# Patient Record
Sex: FEMALE | Race: ASIAN | NOT HISPANIC OR LATINO | URBAN - METROPOLITAN AREA
[De-identification: names, ages, dates, MRNs, and addresses within clinical notes are randomized per-mention and may not be internally consistent; named-entity substitution may affect disease eponyms.]

---
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
## 2021-01-13 NOTE — ED PEDIATRIC NURSE NOTE - CINV DISCH EXIT CARE INSTR PROVIDE
NO VISIT    VIDEO VISIT - no answer   Invited by text and email to vida - no response  Called all numbers via [employer] and not able to do video visit.     Date of Visit: January 22, 2021  Name: Chani Robbins  Date of Birth 1982  [address]   [facility] [address]  [phone] (M) - no answer  [phone] (W) -  No answer/voice mail  [phone] (H) - no answer/voicemail  [email or web address]  mychart  No proxy  Sree Robbins  [phone] - voice mail      EJ - care coordinator for gender identity clinic     Kimberlyn Butler CNP psychiatric nurse practitioner.    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    Discussion about PKAN/NBIA - had prior brain MRI 2016 - discussed considering another brain mri scan or to undergo genetic testing to see if Rose is a carrier.  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     Gait/Balance/Falls     Exercise/Therapy   ?Occupational therapy    Cognitive/Driving     Mood   Ongoing psychiatric care  Buspirone buspar 10mg 2 tabs 3/day   Citalopram celexa 40mg daily    Hydroxyzine vistaril 50mg 3/day as needed    Hallucinations/delusions   Olanzapine zydis zyprexa 5mg ODT as needed    Sleep   Melatonin 5mg    Bladder     GI/Constipation/GERD   Has not done swallow study   Famotidine pepcide 40mg   Hyoscyamine levsin 0.125mg sublingual  Loperamide imodium  2mg   Magnesium oxide 400mg  Omeprazole prilosec 20mg  Odansetron zofran 4mg ODT  Probiotic     ENDO   Atorvastatin lipitor 20mg  Dulaglutide trulicity 0.75mg/0.5ml  weekly  Levonorgestrel kyleena 19.5 mg IUD  Metformin glucophage XR 500mg 24 hr tablet  x 4  Testosterone androgel 1.62% pump 20.25 mg/act gel  Vitamin D3 cholecalciferol 50 mcg 2000  units     Cardio/heart   Lisinopril zestril 5mg   Prazosin minipress 2mg at bedtime    Vision     Heme   Cyanocobalamin vitamin b12 1000mcg   Ferrous sulfate ferosul 325 (65 Fe)mg       Other:  Albuterol proair HFA/proventil HFA/VENTOLIN  (90 base) mcg/act inhaler  Albuterol proventil 2.5mg/3ml neb solution  Ventolin  (90 base) mcg/act inhaler     Medical cannabis    Migraine management per Dr Wendy Avery  APAP-Parabrom-pyrilamine 500-25-15    Diclofenac voltaren 1% gel    Gabapentin neurontin 300mg 2/day     Methocarbamol robaxin 500mg     Naproxen anaprox 220mg     Rizatriptan maxalt MLT 10mg ODT    Urea 40% cream    Valacyclovir valtrex 500mg     Zolmitriptan zomig 5mg    Medications                 Albuterol proair HFA/proventil HFA/VENTOLIN  (90 base) mcg/act inhaler As needed           Albuterol proventil 2.5mg/3ml neb solution As needed           APAP-Parabrom-pyrilamine 500-25-15 As needed           Atorvastatin lipitor 20mg  1           Buspirone buspar 10mg 2 2 2       Citalopram celexa 40mg  1           Cyanocobalamin vitamin b12 1000mcg  1           Diclofenac voltaren 1% gel As needed           Dulaglutide trulicity 0.75mg/0.5ml  weekly           Famotidine pepcide 40mg  As needed           Ferrous sulfate ferosul 325 (65 Fe)mg 1           Gabapentin neurontin 300mg  1   1       Hydroxyzine vistaril 50mg  As needed           Hyoscyamine levsin 0.125mg sublingual As needed           Levonorgestrel kyleena 19.5 mg IUD implanted           Lisinopril zestril 5mg  1/2           Loperamide imodium  2mg  As needed           Magnesium oxide 400mg        1     Medical cannabis As needed           Melatonin 5mg       1     Metformin glucophage XR 500mg 24 hr tablet        4     Methocarbamol robaxin 500mg        2     Naproxen anaprox 220mg  As needed           Olanzapine zydis zyprexa 5mg ODT As needed 1-2       Omeprazole prilosec 20mg  1     1     Onabotulinum toxin getting          "  Odansetron zofran 4mg ODT As needed           Oxycodone-acetaminophen percocet 5-325 Not  using           Prazosin minipress 2mg        1     Probiotic  1           Rizatriptan maxalt 10mg  As needed           Tramadol ultram 50mg        Testosterone androgel 1.62% pump 20.25 mg/act gel        Urea 40% cream using           Valacyclovir valtrex 500mg  1     1     Ventolin  (90 base) mcg/act inhaler  See above           Vitamin D3 cholecalciferol 50 mcg 2000 units  1           Zolmitriptan zomig 5mg Not yet covered              Plan:     involvement  PT  OT  SLP    Medical Decision Making:  #  Chronic progressive medical conditions addressed    Review and/or interpretation of unique test or documentation from a provider outside of neurology    Independent historian provided additional details   I  Prescription drug management and review of potential side effects and/or monitoring for side effects     Health impacted by social determinants of health      I have reviewed the note as documented above.  This accurately captures the substance of my conversation with the patient and total time spent preparing for visit, executing visit and completing visit on the day of the visit:  0 minutes.     Manny Hatch MD      ------------------------------------------------------------------------------------------------------------------------------------------------------------------------    Video-Visit Details    The patient has been notified of following:     \"After a review of the patient s situation, this visit was changed from an in-person visit to a video visit to reduce the risk of COVID-19 exposure.   The patient is being evaluated via a billable video visit.\"    \"This video visit will be conducted via a call between you and your physician/provider. We have found that certain health care needs can be provided without the need for an in-person physical exam.  This service lets us provide the care you " "need with a video conversation.  If a prescription is necessary we can send it directly to your pharmacy.  If lab work is needed we can place an order for that and you can then stop by our lab to have the test done at a later time.    If during the course of the call the physician/provider feels a video visit is not appropriate, you will not be charged for this service.\"     Patient has given verbal consent for Video visit? Yes    Patient would like the video invitation sent by:     Type of service:  Video Visit    Video Start Time:     Video End Time (time video stopped):     Duration:  minutes - see above    Originating Location (pt. Location):     Distant Location (provider location):  [facility] [place]     Mode of Communication:  Video Conference via [employer] (and if not possible then doximity)      Manny Hatch MD      --------------------------------------------------------------------------------------------------------------    Chani Robbins is a 39 year old adult who is being evaluated via a billable video visit.      Charts reviewed  Consult from  Images reviewed        I have reviewed and updated the patient's Past Medical History, Social History, Family History and Medication List.    ALLERGIES  Lamotrigine, Lorazepam, and Sumatriptan    Lasts visit details if there was a last visit:       14 Review of systems  are negative except for   Patient Active Problem List   Diagnosis     PTSD (post-traumatic stress disorder)     Bilateral sensorineural hearing loss     Binge-eating disorder, mild     Borderline personality disorder (H)     Chronic low back pain     DDD (degenerative disc disease), lumbar     Dyslipidemia     Benign essential hypertension     Fibromyalgia     Gastroesophageal reflux disease without esophagitis     Genital herpes     IBS (irritable bowel syndrome)     Intractable chronic migraine without aura and with status migrainosus     MRSA colonization     " Muscle spasm     Obesity (BMI 35.0-39.9 without comorbidity)     Obstructive sleep apnea     PCOS (polycystic ovarian syndrome)     Recurrent major depressive disorder in partial remission (H)     Type 2 diabetes mellitus without complication (H)     Idiopathic peripheral neuropathy     Family history of Travis's disease     Attention deficit hyperactivity disorder (ADHD), combined type     Primary osteoarthritis of both knees     KEMAL (generalized anxiety disorder)     Vitamin D deficiency     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        Allergies   Allergen Reactions     Lamotrigine Rash     Doesn't remember     Lorazepam Hives     Doesn't remember     Sumatriptan Other (See Comments)     Other reaction(s): *Unknown  PN: did not tolerate, doesn't remember       Past Surgical History:   Procedure Laterality Date     C ORAL SURGERY PROCEDURE      wisdom teeth      SECTION      x1     CHOLECYSTECTOMY       ENT SURGERY      deviated septum repair     WRIST SURGERY Left     ganglion cyst     Past Medical History:   Diagnosis Date     Travis disease (H) 15 and 44 repeats 10/2/2020     Social History     Socioeconomic History     Marital status:      Spouse name: Not on file     Number of children: Not on file     Years of education: Not on file     Highest education level: Not on file   Occupational History     Not on file   Social Needs     Financial resource strain: Not on file     Food insecurity     Worry: Not on file     Inability: Not on file     Transportation needs     Medical: Not on file     Non-medical: Not on file   Tobacco Use     Smoking status: Never Smoker     Smokeless tobacco: Never Used   Substance and Sexual Activity     Alcohol use: Yes     Comment: rare     Drug use: Never     Sexual activity: Yes     Partners: Female, Male   Lifestyle     Physical activity     Days per week: Not on file     Minutes per session: Not on file     Stress: Not on file   Relationships     Social  connections     Talks on phone: Not on file     Gets together: Not on file     Attends Mu-ism service: Not on file     Active member of club or organization: Not on file     Attends meetings of clubs or organizations: Not on file     Relationship status: Not on file     Intimate partner violence     Fear of current or ex partner: Not on file     Emotionally abused: Not on file     Physically abused: Not on file     Forced sexual activity: Not on file   Other Topics Concern     Not on file   Social History Narrative    Past Surgical History:     Procedure Laterality Date        SECTION 2009     x 1 ( breech ) (Had one vag delivery)       CYST REMOVAL Left      L wrist ganglion       LAP CHOLECYSTECTOMY 2015       SINUS SURGERY       WISDOM TEETH EXTRACTION              Family History        Medical History Relation Name Comments    Genetic/Chromosomal Disorder     Mother and brother with [place]'s Disease        Relation Name Status Comments         Family History   Problem Relation Age of Onset     [place] Disease Mother      Other - See Comments Mother         lives in [facility]     [place] Disease Brother         [facility]     Other - See Comments Brother         onset of symptoms in his 20s     Other - See Comments Father      Schizophrenia Father      Other - See Comments Sister         not yet tested, lives in  [facility]     Other - See Comments Other         nonbinary born female nathan, lives with parents     Other - See Comments Brother          at 1month - sids     [place] Disease Brother      Neurodegenerative disease Brother         nbia - PKAN     Other - See Comments Brother      Sonny Disease Maternal Uncle      Other - See Comments Maternal Uncle      [place] Disease Maternal Uncle      Other - See Comments Maternal Uncle          last week     Other - See Comments Son         lives with parents     Current Outpatient Medications    Medication Sig Dispense Refill     albuterol (PROAIR HFA/PROVENTIL HFA/VENTOLIN HFA) 108 (90 Base) MCG/ACT inhaler Inhale 1-2 puffs into the lungs every 4 hours as needed        albuterol (PROVENTIL) (2.5 MG/3ML) 0.083% neb solution Inhale 2.5 mg into the lungs daily as needed        APAP-Parabrom-Pyrilamine 500-25-15 MG TABS Take 2 tablets by mouth daily as needed        atorvastatin (LIPITOR) 20 MG tablet Take 20 mg by mouth daily        busPIRone (BUSPAR) 10 MG tablet TAKE 2 TABLETS (20MG) BY MOUTH THREE TIMES A  tablet 2     citalopram (CELEXA) 40 MG tablet Take 1 tablet (40 mg) by mouth daily 30 tablet 2     Continuous Blood Gluc Sensor ([employer] ZAFAR 14 DAY SENSOR) MISC        cyanocobalamin (VITAMIN B-12) 1000 MCG tablet TAKE 1 TABLET BY MOUTH DAILY       diclofenac (VOLTAREN) 1 % topical gel APPLY 2 GRAMS TO SKIN FOUR TIMES DAILY AS NEEDED(USE FOR ARM AND HAND PAIN)       dulaglutide (TRULICITY) 0.75 MG/0.5ML pen Inject 0.75 mg Subcutaneous every 7 days        famotidine (PEPCID) 40 MG tablet Take 1 tablet (40 mg) by mouth daily as needed for heartburn       ferrous sulfate (FEROSUL) 325 (65 Fe) MG tablet Take 325 mg by mouth daily       fluconazole (DIFLUCAN) 150 MG tablet TK 1 T PO 1 TIME FOR 1 DOSE       gabapentin (NEURONTIN) 300 MG capsule Take 300 mg by mouth 2 times daily       hydrOXYzine (VISTARIL) 50 MG capsule Take 1 capsule (50 mg) by mouth 3 times daily as needed for anxiety 90 capsule 2     hyoscyamine (LEVSIN/SL) 0.125 MG sublingual tablet Take 0.125 mg by mouth every 4 hours as needed        levonorgestrel (KYLEENA) 19.5 MG IUD 1 each by Intrauterine route       lisinopril (ZESTRIL) 5 MG tablet Take 2.5 mg by mouth       loperamide (IMODIUM) 2 MG capsule Take 2 mg by mouth as needed        magnesium oxide (MAG-OX) 400 (241.3 Mg) MG tablet TAKE 1 TABLET BY MOUTH AT NIGHT       magnesium oxide (MAG-OX) 400 MG tablet Take 400 mg by mouth       medical cannabis (Patient's own supply)  See Admin Instructions (The purpose of this order is to document that the patient reports taking medical cannabis.  This is not a prescription, and is not used to certify that the patient has a qualifying medical condition.)       melatonin 5 MG tablet Take 5 mg by mouth At Bedtime       metFORMIN (GLUCOPHAGE-XR) 500 MG 24 hr tablet Take 2,000 mg by mouth daily        methocarbamol (ROBAXIN) 500 MG tablet Take 1,000 mg by mouth At Bedtime       Multiple Vitamins-Minerals (MULTIVITAMIN ADULT PO) Take 1 tablet by mouth daily       naproxen sodium (ANAPROX) 220 MG tablet Take 220 mg by mouth 2 times daily as needed        OLANZapine zydis (ZYPREXA) 5 MG ODT Take 1-2 tablets (5-10 mg) by mouth daily as needed for agitation 60 tablet 0     omeprazole (PRILOSEC) 20 MG DR capsule Take 20 mg by mouth 2 times daily        ONABOTULINUMTOXINA  Units       ondansetron (ZOFRAN-ODT) 4 MG ODT tab Place 1 tablet (4 mg) under the tongue       prazosin (MINIPRESS) 2 MG capsule TAKE 1 CAPSULE BY MOUTH AT BEDTIME 30 capsule 2     Probiotic Product (ACIDOPHILUS PROBIOTIC BLEND) CAPS Take 1 capsule by mouth       rizatriptan (MAXALT-MLT) 10 MG ODT 1 tablet at onset of typical headache. May repeat 1 in 2 hours. Max 2 a day. Max 9 days per month.       traMADol (ULTRAM) 50 MG tablet TK 1 T PO Q 6 H PRN P       Urea 40 % CREA Apply topically 2 times daily        valACYclovir (VALTREX) 500 MG tablet Take 1 tablet (500 mg) by mouth 2 times daily       vitamin D3 (CHOLECALCIFEROL) 50 mcg (2000 units) tablet Take 1 tablet by mouth daily       ZOLMitriptan (ZOMIG) 5 MG nasal spray 1 at onset of typical headache. May repeat in 2 hours. Max 2/day. Max 9 days per month.           Medications                                                                                                                                                                                                                   yes
